# Patient Record
Sex: FEMALE | Race: BLACK OR AFRICAN AMERICAN | NOT HISPANIC OR LATINO | Employment: UNEMPLOYED | URBAN - METROPOLITAN AREA
[De-identification: names, ages, dates, MRNs, and addresses within clinical notes are randomized per-mention and may not be internally consistent; named-entity substitution may affect disease eponyms.]

---

## 2023-09-23 ENCOUNTER — HOSPITAL ENCOUNTER (EMERGENCY)
Facility: HOSPITAL | Age: 23
Discharge: HOME | End: 2023-09-23
Attending: STUDENT IN AN ORGANIZED HEALTH CARE EDUCATION/TRAINING PROGRAM
Payer: MEDICAID

## 2023-09-23 VITALS
BODY MASS INDEX: 28.28 KG/M2 | RESPIRATION RATE: 18 BRPM | DIASTOLIC BLOOD PRESSURE: 73 MMHG | HEIGHT: 66 IN | WEIGHT: 176 LBS | SYSTOLIC BLOOD PRESSURE: 165 MMHG | HEART RATE: 84 BPM | TEMPERATURE: 98 F | OXYGEN SATURATION: 98 %

## 2023-09-23 DIAGNOSIS — R52 PAIN: ICD-10-CM

## 2023-09-23 DIAGNOSIS — S63.501A SPRAIN OF RIGHT WRIST, INITIAL ENCOUNTER: Primary | ICD-10-CM

## 2023-09-23 PROCEDURE — 99283 EMERGENCY DEPT VISIT LOW MDM: CPT

## 2023-09-24 NOTE — ED PROVIDER NOTES
Encounter Date: 9/23/2023       History     Chief Complaint   Patient presents with    Wrist Pain     Complains of nontraumatic right wrist pain since Monday      Patient is a 23-year-old  female with no significant past medical history presented with a 5 day history of right wrist pain.  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  She presents today because it has not improved over the last couple days.  Patient states she is been taking NSAIDs but she expected it to be better by now.  She denies any other injuries or any other complaints.  Patient states the pain is worse with movement and better with rest.      Review of patient's allergies indicates:  No Known Allergies  Past Medical History:   Diagnosis Date    Anxiety disorder, unspecified     Depression      History reviewed. No pertinent surgical history.  No family history on file.  Social History     Tobacco Use    Smoking status: Never    Smokeless tobacco: Never     Review of Systems   Constitutional:  Negative for chills, fatigue and fever.   HENT:  Negative for congestion, sore throat and trouble swallowing.    Eyes:  Negative for pain and visual disturbance.   Respiratory:  Negative for cough, shortness of breath and wheezing.    Cardiovascular:  Negative for chest pain and palpitations.   Gastrointestinal:  Negative for abdominal pain, blood in stool, constipation, diarrhea, nausea and vomiting.   Genitourinary:  Negative for dysuria and hematuria.   Musculoskeletal:  Positive for arthralgias. Negative for back pain and myalgias.   Skin:  Negative for rash and wound.   Neurological:  Negative for seizures, syncope and headaches.   Psychiatric/Behavioral:  Negative for confusion. The patient is not nervous/anxious.        Physical Exam     Initial Vitals [09/23/23 1854]   BP Pulse Resp Temp SpO2   (!) 165/73 84 18 98.3 °F (36.8 °C) 98 %       MAP       --         Physical Exam    Nursing note and vitals reviewed.  Constitutional: She appears well-developed and well-nourished. She is not diaphoretic. No distress.   HENT:   Head: Normocephalic.   Right Ear: External ear normal.   Left Ear: External ear normal.   Nose: Nose normal.   Eyes: Conjunctivae and EOM are normal. Right eye exhibits no discharge. Left eye exhibits no discharge. No scleral icterus.   Neck:   Normal range of motion.  Cardiovascular:  Normal rate, regular rhythm and normal heart sounds.     Exam reveals no gallop and no friction rub.       No murmur heard.  Pulmonary/Chest: Breath sounds normal. No stridor. No respiratory distress. She has no wheezes. She has no rhonchi. She has no rales.   Musculoskeletal:         General: Normal range of motion.      Cervical back: Normal range of motion.      Comments: Right wrist:  Radial pulses appreciated equal to that left.  Neurovascularly intact.  Patient able to make the okay sign.  No obvious gross deformities on exam.  Pain is elicited with passive range of motion of the wrist.     Neurological: She is alert.   Skin: Skin is warm. No rash noted. No erythema.   Psychiatric: She has a normal mood and affect. Her behavior is normal.         ED Course   Procedures  Labs Reviewed - No data to display       Imaging Results              X-Ray Wrist Complete Right (Final result)  Result time 09/23/23 20:24:26      Final result by Jay Fonseca MD (09/23/23 20:24:26)                   Impression:      No acute abnormality of the right wrist.      Electronically signed by: Jay Fonseca MD  Date:    09/23/2023  Time:    20:24               Narrative:    EXAMINATION:  Three radiographic views of the RIGHT WRIST.    CLINICAL HISTORY:  Pain, unspecified    TECHNIQUE:  Three radiographic views of the RIGHT WRIST.    COMPARISON:  None.    FINDINGS:  Three views of the right wrist demonstrate normal alignment.  There is no fracture.  There is no bony mass  lesion.  There is no soft tissue swelling.                                    X-Rays:   Independently Interpreted Readings:   Other Readings:  X-ray wrist:  No acute osseous abnormalities per read.    Medications - No data to display  Medical Decision Making  Differentials: Fracture, contusion, sprain   [age] year old  female well-appearing with stable vital signs presents with right wrist pain.  X-ray showed no acute osseous abnormalities per my read.  Physical exam findings and history are most consistent with a wrist sprain.  Advised rice therapy.  All questions answered in layman's terms and return precautions were discussed    Amount and/or Complexity of Data Reviewed  Radiology: ordered and independent interpretation performed. Decision-making details documented in ED Course.                               Clinical Impression:   Final diagnoses:  [R52] Pain  [S63.501A] Sprain of right wrist, initial encounter (Primary)        ED Disposition Condition    Discharge Stable          ED Prescriptions    None       Follow-up Information       Follow up With Specialties Details Why Contact Info    [facility] - Emergency Dept Emergency Medicine  If symptoms worsen [address] [id number] [phone]             Bryant Tavera MD  09/23/23 2028

## 2024-01-26 ENCOUNTER — HOSPITAL ENCOUNTER (EMERGENCY)
Facility: HOSPITAL | Age: 24
Discharge: HOME | End: 2024-01-26
Attending: SPECIALIST
Payer: MEDICAID

## 2024-01-26 VITALS
TEMPERATURE: 98 F | BODY MASS INDEX: 30.86 KG/M2 | WEIGHT: 192 LBS | HEART RATE: 98 BPM | HEIGHT: 66 IN | RESPIRATION RATE: 18 BRPM | SYSTOLIC BLOOD PRESSURE: 127 MMHG | DIASTOLIC BLOOD PRESSURE: 62 MMHG | OXYGEN SATURATION: 98 %

## 2024-01-26 DIAGNOSIS — S96.911A RIGHT ANKLE STRAIN, INITIAL ENCOUNTER: ICD-10-CM

## 2024-01-26 DIAGNOSIS — S39.012A LUMBAR STRAIN, INITIAL ENCOUNTER: Primary | ICD-10-CM

## 2024-01-26 LAB
APPEARANCE UR: ABNORMAL
B-HCG SERPL QL: POSITIVE
BACTERIA #/AREA URNS AUTO: ABNORMAL /HPF
BILIRUB UR QL STRIP.AUTO: NEGATIVE
COLOR UR AUTO: YELLOW
GLUCOSE UR QL STRIP.AUTO: NEGATIVE
KETONES UR QL STRIP.AUTO: NEGATIVE
LEUKOCYTE ESTERASE UR QL STRIP.AUTO: ABNORMAL
NITRITE UR QL STRIP.AUTO: NEGATIVE
PH UR STRIP.AUTO: 5.5 [PH]
PROT UR QL STRIP.AUTO: NEGATIVE
RBC #/AREA URNS AUTO: ABNORMAL /HPF
RBC UR QL AUTO: NEGATIVE
SP GR UR STRIP.AUTO: >=1.03 (ref 1–1.03)
SQUAMOUS #/AREA URNS AUTO: ABNORMAL /HPF
UROBILINOGEN UR STRIP-ACNC: 1
WBC #/AREA URNS AUTO: ABNORMAL /HPF

## 2024-01-26 PROCEDURE — 99284 EMERGENCY DEPT VISIT MOD MDM: CPT

## 2024-01-26 PROCEDURE — 87086 URINE CULTURE/COLONY COUNT: CPT | Performed by: SPECIALIST

## 2024-01-26 PROCEDURE — 81003 URINALYSIS AUTO W/O SCOPE: CPT | Performed by: SPECIALIST

## 2024-01-26 PROCEDURE — 81025 URINE PREGNANCY TEST: CPT | Performed by: SPECIALIST

## 2024-01-26 NOTE — Clinical Note
"Helen Fan" Mp was seen and treated in our emergency department on 1/26/2024.  She may return to work on 01/29/2024.       If you have any questions or concerns, please don't hesitate to call.       RN    "

## 2024-01-27 NOTE — ED PROVIDER NOTES
Encounter Date: 1/26/2024       History     Chief Complaint   Patient presents with    Back Pain     Pt with complaints of middle to lower back pain and right ankle pain for the last 1-2 weeks.  She is 14 weeks pregnant.  She was seen at urgent care for ankle but she said they didn't do anything for her.  Denies dysuria or vaginal bleeding      Ankle Pain     Patient is 14 weeks pregnant and complains of lower back pain and right ankle pain for the last 2 weeks; she states she is on her feet at work and this is causing her pain; no dysuria or vaginal bleeding, no contractions    The history is provided by the patient.     Review of patient's allergies indicates:  No Known Allergies  Past Medical History:   Diagnosis Date    Anxiety disorder, unspecified     Depression      History reviewed. No pertinent surgical history.  History reviewed. No pertinent family history.  Social History     Tobacco Use    Smoking status: Never    Smokeless tobacco: Never     Review of Systems   Constitutional: Negative.    HENT: Negative.     Respiratory: Negative.     Cardiovascular: Negative.    Gastrointestinal: Negative.    Musculoskeletal: Negative.    Skin: Negative.    Neurological: Negative.    All other systems reviewed and are negative.      Physical Exam     Initial Vitals [01/26/24 1939]   BP Pulse Resp Temp SpO2   127/62 98 18 98.2 °F (36.8 °C) 98 %      MAP       --         Physical Exam    Nursing note and vitals reviewed.  Constitutional: She appears well-developed and well-nourished.   HENT:   Head: Normocephalic and atraumatic.   Mouth/Throat: Oropharynx is clear and moist.   Eyes: EOM are normal. Pupils are equal, round, and reactive to light.   Neck: Neck supple.   Normal range of motion.  Cardiovascular:  Normal rate, regular rhythm, normal heart sounds and intact distal pulses.           Pulmonary/Chest: Breath sounds normal.   Abdominal: Abdomen is soft. Bowel sounds are normal.   Musculoskeletal:          General: Normal range of motion.      Cervical back: Normal range of motion and neck supple.      Comments: Back with good range of motion; right ankle with slight swelling laterally, full range of motion, no bruising or deformity     Neurological: She is alert and oriented to person, place, and time. She has normal strength.   Skin: Skin is warm and dry.         ED Course   Procedures  Labs Reviewed   URINALYSIS, REFLEX TO URINE CULTURE - Abnormal; Notable for the following components:       Result Value    Appearance, UA Cloudy (*)     Leukocyte Esterase, UA Small (*)     All other components within normal limits   PREGNANCY TEST, URINE RAPID - Abnormal; Notable for the following components:    Beta hCG Qualitative, Urine Positive (*)     All other components within normal limits   URINALYSIS, MICROSCOPIC - Abnormal; Notable for the following components:    Bacteria, UA Few (*)     WBC, UA 11-20 (*)     Squamous Epithelial Cells, UA Many (*)     All other components within normal limits   CULTURE, URINE          Imaging Results    None          Medications - No data to display  Medical Decision Making  Patient is 14 weeks pregnant and complains of lower back pain and right ankle pain for the last 2 weeks; she states she is on her feet at work and this is causing her pain; no dysuria or vaginal bleeding, no contractions    DIFFERENTIAL DIAGNOSIS- lumbar strain, ankle strain    Amount and/or Complexity of Data Reviewed  Labs: ordered. Decision-making details documented in ED Course.    Risk  Risk Details: Urinalysis unremarkable; patient encouraged to take Tylenol as needed for her discomfort and follow up with her Ob clinic               ED Course as of 01/27/24 0117 Fri Jan 26, 2024 2031 Toña Smith, UA(!): Many [DD]      ED Course User Index  [DD] James Jimenez MD                           Clinical Impression:  Final diagnoses:  [S39.012A] Lumbar strain, initial encounter (Primary)  [S96.911A] Right  ankle strain, initial encounter          ED Disposition Condition    Discharge Stable          ED Prescriptions    None       Follow-up Information       Follow up With Specialties Details Why Contact Info    OBGYN   As needed              James Jimenez MD  01/27/24 6531

## 2024-01-29 LAB
BACTERIA UR CULT: ABNORMAL
BACTERIA UR CULT: ABNORMAL

## 2024-02-15 DIAGNOSIS — Z36.89 ENCOUNTER FOR FETAL ANATOMIC SURVEY: Primary | ICD-10-CM

## 2024-03-05 DIAGNOSIS — Z36.89 ENCOUNTER FOR FETAL ANATOMIC SURVEY: Primary | ICD-10-CM

## 2024-03-06 ENCOUNTER — PROCEDURE VISIT (OUTPATIENT)
Dept: MATERNAL FETAL MEDICINE | Facility: CLINIC | Age: 24
End: 2024-03-06
Payer: MEDICAID

## 2024-03-06 DIAGNOSIS — Z36.89 ENCOUNTER FOR FETAL ANATOMIC SURVEY: ICD-10-CM

## 2024-03-06 PROCEDURE — 76805 OB US >/= 14 WKS SNGL FETUS: CPT | Mod: S$GLB,,, | Performed by: OBSTETRICS & GYNECOLOGY
